# Patient Record
(demographics unavailable — no encounter records)

---

## 2024-12-20 NOTE — HISTORY OF PRESENT ILLNESS
[de-identified] : Mr. JACQUIE ARCE is a 26-year-old male here today as a hospital follow-up for Cerebral venous thrombosis, most likely due to Myeloproliferative disorder, ET confirmed, JAK2 (+).  He presented with a chief complaint of headache over the previous 2 weeks. Patient went to see ophthalmologist for contacts renewal and was noted to have optic nerve swelling b/l. Patient then saw neurologist who noted papilledema and ordered brain MRI suspicious for sinus thrombosis. MRI was done but neurology wanted admission for MRV and possible spinal tap. Patient had headaches recently but denies any complaints at this time, is asymptomatic.  RADIOLOGIC WORKUP MR Venogram Brain (5/15/19) IMPRESSION: 1. *Extensive, long segment, but incompletely occlusive thrombus within the superior sagittal sinus, and right transverse and sigmoid sinuses. Additional thrombus within scattered cortical veins. 2. Apparent nasopharyngeal and palatine tonsillar edema, recommend correlation with exam. 3. 7 mm pineal cyst, incidental. CT Brain (4/29/19) IMPRESSION: Small (4 mm) hyperdense lesion at the level of the anterior third ventricle/foramen of Dominguez potentially reflecting a colloid cyst in this location. Recommend further evaluation with a contrast-enhanced brain MRI. No evidence of hydrocephalus.  LAB WORKUP (6/10/19) PTT 30.2, INR 2.5 (5/18/19) WBC 8.62, Hgb 16.2,    Since he had thrombocytosis, we did a Jak2 which was positive.  [de-identified] : 6/26/19 He is doing well. He has no issues with Hydrea. His PLT did come down but jose under 400,000, granted PLT count is not a risk factor for thrombosis maybe just being on Hydrea alone is enough to decrease events with Coumadin? but at this point will increase Hydrea to 1000 mg M/W/F and all other days 500 mg.  7/17/19 He is here for follow-up. CBC is stable, but PLT still high at 441,000. We will increase Hydrea to 1,000 daily. He is feeling well otherwise, no headaches, chest pain or visual disturbances. No refills needed.   8/14/19 Patient is here for a follow-up visit. He is feeling well with no complaints. Most recent CBC is normal at goal, from today. Patient denies fever, chills, nausea, vomiting. He has resumed exercising. He is tolerating Hydrea and reports no headaches.    10/16/19 Patient is here for a follow-up visit of JAK2 (+) ET and Cerebral venous thrombosis. He is feeling well, only reports few spots on dorsal aspect of both hands near the thumb, bilaterally, unclear etiology. Most recent CBC is stable. Patient denies fever, chills, nausea, vomiting or blurry vision.  1/15/20 He is doing well. No complaints. Vision is good. CBC is WNL.  6/8/2020 Patient is here for a follow-up visit of JAK2 (+) ET and Cerebral venous thrombosis. He is feeling well today, remains on Coumadin without complications. Most recent CBC is stable. He takes Hydrea 500mg BID, tolerating well.  Patient denies fever, chills, new SOB, CP, blurry vision or bleeding.    12/28/20 He is here for follow up. He feels well. No issues with Coumadin or Hydrea. CBC is at goal.  6/23/21 Patient is here for a follow-up visit of JAK2 (+) ET and Cerebral venous thrombosis. He is feeling well today, remains on Coumadin without complications (follows with [facility]); most recent INR is 2.9. Reiterated importance for strict smoking cessation. Most recent CBC is essentially stable. He takes Hydrea 500mg BID, tolerating well. Patient denies fever, chills, new SOB, CP, blurry vision or bleeding.   12/15/21 Patient is here for a follow-up visit of JAK2 (+) ET and Cerebral venous thrombosis. He is feeling well today, remains on Coumadin without complications (follows with [facility]); most recent INR is 2.4. Most recent CBC is essentially stable with only slight leukopenia (normal diff). He takes Hydrea 500mg BID, tolerating well and requesting refill. Patient denies fever, chills, new SOB, CP, blurry vision or bleeding. He has shed some weight, intentionally, via increased physical activity. Reiterated importance for strict smoking cessation.    6/17/22 He is here for follow up. Last INR was 2.2 and CBC from today with HCT of 46.7.  4/19/23 He is here for follow-up. He continues to follow with the coumadin clinic and his INRs have remained in therapeutic ranges. His CBC today shows platelet count of 255, hemoglobin 17.0 g/dL, HCT 47.7%. He is doing well with no complaints today. We discussed increasing his Hydrea to 1500 in order to keep his HCT below 45% in case this is PV. He has not followed up with ophthalmology.   5/24/23 He is here for follow-up. His HCT is now at goal with higher dose of Hydrea. He is doing well. Will try to loose weight.  8/23/23 He is here for follow up. CBC today showed HGB of 11.1 , WBC 3.72. He feels tired at times.  10/25/23 He is here for follow up. He feels well. CBC is at goal.  02/08/2024 Reports feeling well, no changes in chronic conditions or new complaints since the last visit.  7/16/2024 He is here for follow up. CBC is at gaol. He takes extra dose of hydrea twice a week but does not recall the days. INR as a bit high today he was seen in coumadin center.  December 18 2024 Robles is here for follow-up he is doing well no issues with Coumadin or Hydrea

## 2024-12-20 NOTE — REASON FOR VISIT
[Follow-Up Visit] : a follow-up visit for [FreeTextEntry2] : Cerebral venous thrombosis & ET, JAK2 (+)

## 2024-12-20 NOTE — PHYSICAL EXAM
[Fully active, able to carry on all pre-disease performance without restriction] : Status 0 - Fully active, able to carry on all pre-disease performance without restriction [Normal] : affect appropriate [de-identified] : few small bumps on dorsal aspect of both hands, not bothersome, possibly 2/2 Hydrea

## 2024-12-20 NOTE — ASSESSMENT
[FreeTextEntry1] : # Cerebral venous thrombosis, due to ET, JAK2 (+), MPN (+). - HGB has been on the higher side maybe P.Vera. - His labs showed a PLT level of 650K (no available platelets count from before). - We discussed that his elevated hematocrit may signify that this is PV rather than ET. In PV, the goal hematocrit is below 45%, mainly to prevent thrombotic events.  - we previously spoke about prognosis, risk about leukemic transformation and bone marrow biopsy to r/o premylofibrosis (mainly for risk stratification/prognosis) he wanted to hold of a marrow which is reasonable.    PLAN: - CBC CMP ordered will give him  call and if needed will adjust Hydrea  - continue Hydrea 1500 mg PO  twice a week and 1000 mg PO other days  - continue life-long anticoagulation with Coumadin - F/U with [facility] for INR monitoring as indicated. HE is aware we can switch to [facility]  - F/U with follow-up with ophthalmology (at least annually) as his main symptoms were related to his papilledema at diagnosis to make sure no longer has papilledema  - Labs today: CBC CMP RTC in 5 months with CBC CMP if CBC at goal
[FreeTextEntry1] : # Cerebral venous thrombosis, due to ET, JAK2 (+), MPN (+). - HGB has been on the higher side maybe P.Vera. - His labs showed a PLT level of 650K (no available platelets count from before). - We discussed that his elevated hematocrit may signify that this is PV rather than ET. In PV, the goal hematocrit is below 45%, mainly to prevent thrombotic events.  - we previously spoke about prognosis, risk about leukemic transformation and bone marrow biopsy to r/o premylofibrosis (mainly for risk stratification/prognosis) he wanted to hold of a marrow which is reasonable.    PLAN: - CBC CMP ordered will give him  call and if needed will adjust Hydrea  - continue Hydrea 1500 mg PO  twice a week and 1000 mg PO other days  - continue life-long anticoagulation with Coumadin - F/U with [facility] for INR monitoring as indicated. HE is aware we can switch to [facility]  - F/U with follow-up with ophthalmology (at least annually) as his main symptoms were related to his papilledema at diagnosis to make sure no longer has papilledema  - Labs today: CBC CMP RTC in 5 months with CBC CMP if CBC at goal
Camilo Castrejon(Attending)

## 2024-12-20 NOTE — PHYSICAL EXAM
[Fully active, able to carry on all pre-disease performance without restriction] : Status 0 - Fully active, able to carry on all pre-disease performance without restriction [Normal] : affect appropriate [de-identified] : few small bumps on dorsal aspect of both hands, not bothersome, possibly 2/2 Hydrea

## 2024-12-20 NOTE — HISTORY OF PRESENT ILLNESS
[de-identified] : Mr. JACQUIE ARCE is a 26-year-old male here today as a hospital follow-up for Cerebral venous thrombosis, most likely due to Myeloproliferative disorder, ET confirmed, JAK2 (+).  He presented with a chief complaint of headache over the previous 2 weeks. Patient went to see ophthalmologist for contacts renewal and was noted to have optic nerve swelling b/l. Patient then saw neurologist who noted papilledema and ordered brain MRI suspicious for sinus thrombosis. MRI was done but neurology wanted admission for MRV and possible spinal tap. Patient had headaches recently but denies any complaints at this time, is asymptomatic.  RADIOLOGIC WORKUP MR Venogram Brain (5/15/19) IMPRESSION: 1. *Extensive, long segment, but incompletely occlusive thrombus within the superior sagittal sinus, and right transverse and sigmoid sinuses. Additional thrombus within scattered cortical veins. 2. Apparent nasopharyngeal and palatine tonsillar edema, recommend correlation with exam. 3. 7 mm pineal cyst, incidental. CT Brain (4/29/19) IMPRESSION: Small (4 mm) hyperdense lesion at the level of the anterior third ventricle/foramen of Dominguez potentially reflecting a colloid cyst in this location. Recommend further evaluation with a contrast-enhanced brain MRI. No evidence of hydrocephalus.  LAB WORKUP (6/10/19) PTT 30.2, INR 2.5 (5/18/19) WBC 8.62, Hgb 16.2,    Since he had thrombocytosis, we did a Jak2 which was positive.  [de-identified] : 6/26/19 He is doing well. He has no issues with Hydrea. His PLT did come down but jose under 400,000, granted PLT count is not a risk factor for thrombosis maybe just being on Hydrea alone is enough to decrease events with Coumadin? but at this point will increase Hydrea to 1000 mg M/W/F and all other days 500 mg.  7/17/19 He is here for follow-up. CBC is stable, but PLT still high at 441,000. We will increase Hydrea to 1,000 daily. He is feeling well otherwise, no headaches, chest pain or visual disturbances. No refills needed.   8/14/19 Patient is here for a follow-up visit. He is feeling well with no complaints. Most recent CBC is normal at goal, from today. Patient denies fever, chills, nausea, vomiting. He has resumed exercising. He is tolerating Hydrea and reports no headaches.    10/16/19 Patient is here for a follow-up visit of JAK2 (+) ET and Cerebral venous thrombosis. He is feeling well, only reports few spots on dorsal aspect of both hands near the thumb, bilaterally, unclear etiology. Most recent CBC is stable. Patient denies fever, chills, nausea, vomiting or blurry vision.  1/15/20 He is doing well. No complaints. Vision is good. CBC is WNL.  6/8/2020 Patient is here for a follow-up visit of JAK2 (+) ET and Cerebral venous thrombosis. He is feeling well today, remains on Coumadin without complications. Most recent CBC is stable. He takes Hydrea 500mg BID, tolerating well.  Patient denies fever, chills, new SOB, CP, blurry vision or bleeding.    12/28/20 He is here for follow up. He feels well. No issues with Coumadin or Hydrea. CBC is at goal.  6/23/21 Patient is here for a follow-up visit of JAK2 (+) ET and Cerebral venous thrombosis. He is feeling well today, remains on Coumadin without complications (follows with [facility]); most recent INR is 2.9. Reiterated importance for strict smoking cessation. Most recent CBC is essentially stable. He takes Hydrea 500mg BID, tolerating well. Patient denies fever, chills, new SOB, CP, blurry vision or bleeding.   12/15/21 Patient is here for a follow-up visit of JAK2 (+) ET and Cerebral venous thrombosis. He is feeling well today, remains on Coumadin without complications (follows with [facility]); most recent INR is 2.4. Most recent CBC is essentially stable with only slight leukopenia (normal diff). He takes Hydrea 500mg BID, tolerating well and requesting refill. Patient denies fever, chills, new SOB, CP, blurry vision or bleeding. He has shed some weight, intentionally, via increased physical activity. Reiterated importance for strict smoking cessation.    6/17/22 He is here for follow up. Last INR was 2.2 and CBC from today with HCT of 46.7.  4/19/23 He is here for follow-up. He continues to follow with the coumadin clinic and his INRs have remained in therapeutic ranges. His CBC today shows platelet count of 255, hemoglobin 17.0 g/dL, HCT 47.7%. He is doing well with no complaints today. We discussed increasing his Hydrea to 1500 in order to keep his HCT below 45% in case this is PV. He has not followed up with ophthalmology.   5/24/23 He is here for follow-up. His HCT is now at goal with higher dose of Hydrea. He is doing well. Will try to loose weight.  8/23/23 He is here for follow up. CBC today showed HGB of 11.1 , WBC 3.72. He feels tired at times.  10/25/23 He is here for follow up. He feels well. CBC is at goal.  02/08/2024 Reports feeling well, no changes in chronic conditions or new complaints since the last visit.  7/16/2024 He is here for follow up. CBC is at gaol. He takes extra dose of hydrea twice a week but does not recall the days. INR as a bit high today he was seen in coumadin center.  December 18 2024 Robles is here for follow-up he is doing well no issues with Coumadin or Hydrea

## 2025-02-27 NOTE — HISTORY OF PRESENT ILLNESS
[de-identified] : Mr. JACQUIE ARCE is a 26-year-old male here today as a hospital follow-up for Cerebral venous thrombosis, most likely due to Myeloproliferative disorder, ET confirmed, JAK2 (+).  He presented with a chief complaint of headache over the previous 2 weeks. Patient went to see ophthalmologist for contacts renewal and was noted to have optic nerve swelling b/l. Patient then saw neurologist who noted papilledema and ordered brain MRI suspicious for sinus thrombosis. MRI was done but neurology wanted admission for MRV and possible spinal tap. Patient had headaches recently but denies any complaints at this time, is asymptomatic.  RADIOLOGIC WORKUP MR Venogram Brain (5/15/19) IMPRESSION: 1. *Extensive, long segment, but incompletely occlusive thrombus within the superior sagittal sinus, and right transverse and sigmoid sinuses. Additional thrombus within scattered cortical veins. 2. Apparent nasopharyngeal and palatine tonsillar edema, recommend correlation with exam. 3. 7 mm pineal cyst, incidental. CT Brain (4/29/19) IMPRESSION: Small (4 mm) hyperdense lesion at the level of the anterior third ventricle/foramen of Dominguez potentially reflecting a colloid cyst in this location. Recommend further evaluation with a contrast-enhanced brain MRI. No evidence of hydrocephalus.  LAB WORKUP (6/10/19) PTT 30.2, INR 2.5 (5/18/19) WBC 8.62, Hgb 16.2,    Since he had thrombocytosis, we did a Jak2 which was positive.  [de-identified] : 6/26/19 He is doing well. He has no issues with Hydrea. His PLT did come down but jose under 400,000, granted PLT count is not a risk factor for thrombosis maybe just being on Hydrea alone is enough to decrease events with Coumadin? but at this point will increase Hydrea to 1000 mg M/W/F and all other days 500 mg.  7/17/19 He is here for follow-up. CBC is stable, but PLT still high at 441,000. We will increase Hydrea to 1,000 daily. He is feeling well otherwise, no headaches, chest pain or visual disturbances. No refills needed.   8/14/19 Patient is here for a follow-up visit. He is feeling well with no complaints. Most recent CBC is normal at goal, from today. Patient denies fever, chills, nausea, vomiting. He has resumed exercising. He is tolerating Hydrea and reports no headaches.    10/16/19 Patient is here for a follow-up visit of JAK2 (+) ET and Cerebral venous thrombosis. He is feeling well, only reports few spots on dorsal aspect of both hands near the thumb, bilaterally, unclear etiology. Most recent CBC is stable. Patient denies fever, chills, nausea, vomiting or blurry vision.  1/15/20 He is doing well. No complaints. Vision is good. CBC is WNL.  6/8/2020 Patient is here for a follow-up visit of JAK2 (+) ET and Cerebral venous thrombosis. He is feeling well today, remains on Coumadin without complications. Most recent CBC is stable. He takes Hydrea 500mg BID, tolerating well.  Patient denies fever, chills, new SOB, CP, blurry vision or bleeding.    12/28/20 He is here for follow up. He feels well. No issues with Coumadin or Hydrea. CBC is at goal.  6/23/21 Patient is here for a follow-up visit of JAK2 (+) ET and Cerebral venous thrombosis. He is feeling well today, remains on Coumadin without complications (follows with [facility]); most recent INR is 2.9. Reiterated importance for strict smoking cessation. Most recent CBC is essentially stable. He takes Hydrea 500mg BID, tolerating well. Patient denies fever, chills, new SOB, CP, blurry vision or bleeding.   12/15/21 Patient is here for a follow-up visit of JAK2 (+) ET and Cerebral venous thrombosis. He is feeling well today, remains on Coumadin without complications (follows with [facility]); most recent INR is 2.4. Most recent CBC is essentially stable with only slight leukopenia (normal diff). He takes Hydrea 500mg BID, tolerating well and requesting refill. Patient denies fever, chills, new SOB, CP, blurry vision or bleeding. He has shed some weight, intentionally, via increased physical activity. Reiterated importance for strict smoking cessation.    6/17/22 He is here for follow up. Last INR was 2.2 and CBC from today with HCT of 46.7.  4/19/23 He is here for follow-up. He continues to follow with the coumadin clinic and his INRs have remained in therapeutic ranges. His CBC today shows platelet count of 255, hemoglobin 17.0 g/dL, HCT 47.7%. He is doing well with no complaints today. We discussed increasing his Hydrea to 1500 in order to keep his HCT below 45% in case this is PV. He has not followed up with ophthalmology.   5/24/23 He is here for follow-up. His HCT is now at goal with higher dose of Hydrea. He is doing well. Will try to loose weight.  8/23/23 He is here for follow up. CBC today showed HGB of 11.1 , WBC 3.72. He feels tired at times.  10/25/23 He is here for follow up. He feels well. CBC is at goal.  02/08/2024 Reports feeling well, no changes in chronic conditions or new complaints since the last visit.  7/16/2024 He is here for follow up. CBC is at gaol. He takes extra dose of hydrea twice a week but does not recall the days. INR as a bit high today he was seen in coumadin center.  December 18 2024 Robles is here for follow-up he is doing well no issues with Coumadin or Hydrea  February 27, 2025 He is here for follow-up he has been on Hydrea 1500 mg daily since last CBC from December had a hematocrit of 46% today CBC is at goal

## 2025-02-27 NOTE — ASSESSMENT
[FreeTextEntry1] : # Cerebral venous thrombosis, due to ET, JAK2 (+), MPN (+). - HGB has been on the higher side maybe P.Vera. - His labs showed a PLT level of 650K (no available platelets count from before). - We discussed that his elevated hematocrit may signify that this is PV rather than ET. In PV, the goal hematocrit is below 45%, mainly to prevent thrombotic events.  - we previously spoke about prognosis, risk about leukemic transformation and bone marrow biopsy to r/o premylofibrosis (mainly for risk stratification/prognosis) he wanted to hold of a marrow which is reasonable.    PLAN: - CBC CMP were done CBC is at goal - continue Hydrea 1500 mg PO daily - continue life-long anticoagulation with Coumadin - F/U with [facility] for INR monitoring as indicated. HE is aware we can switch to [facility]  - F/U with follow-up with ophthalmology (at least annually) as his main symptoms were related to his papilledema at diagnosis to make sure no longer has papilledema  - Labs today: CBC CMP RTC in 4 months is fine

## 2025-02-27 NOTE — PHYSICAL EXAM
[Fully active, able to carry on all pre-disease performance without restriction] : Status 0 - Fully active, able to carry on all pre-disease performance without restriction [Normal] : affect appropriate [de-identified] : few small bumps on dorsal aspect of both hands, not bothersome, possibly 2/2 Hydrea

## 2025-06-25 NOTE — PHYSICAL EXAM
[Fully active, able to carry on all pre-disease performance without restriction] : Status 0 - Fully active, able to carry on all pre-disease performance without restriction [Normal] : affect appropriate [de-identified] : few small bumps on dorsal aspect of both hands, not bothersome, possibly 2/2 Hydrea

## 2025-06-25 NOTE — HISTORY OF PRESENT ILLNESS
[de-identified] : Mr. JACQUIE ARCE is a 26-year-old male here today as a hospital follow-up for Cerebral venous thrombosis, most likely due to Myeloproliferative disorder, ET confirmed, JAK2 (+).  He presented with a chief complaint of headache over the previous 2 weeks. Patient went to see ophthalmologist for contacts renewal and was noted to have optic nerve swelling b/l. Patient then saw neurologist who noted papilledema and ordered brain MRI suspicious for sinus thrombosis. MRI was done but neurology wanted admission for MRV and possible spinal tap. Patient had headaches recently but denies any complaints at this time, is asymptomatic.  RADIOLOGIC WORKUP MR Venogram Brain (5/15/19) IMPRESSION: 1. *Extensive, long segment, but incompletely occlusive thrombus within the superior sagittal sinus, and right transverse and sigmoid sinuses. Additional thrombus within scattered cortical veins. 2. Apparent nasopharyngeal and palatine tonsillar edema, recommend correlation with exam. 3. 7 mm pineal cyst, incidental. CT Brain (4/29/19) IMPRESSION: Small (4 mm) hyperdense lesion at the level of the anterior third ventricle/foramen of Dominguez potentially reflecting a colloid cyst in this location. Recommend further evaluation with a contrast-enhanced brain MRI. No evidence of hydrocephalus.  LAB WORKUP (6/10/19) PTT 30.2, INR 2.5 (5/18/19) WBC 8.62, Hgb 16.2,    Since he had thrombocytosis, we did a Jak2 which was positive.  [de-identified] : 6/26/19 He is doing well. He has no issues with Hydrea. His PLT did come down but joes under 400,000, granted PLT count is not a risk factor for thrombosis maybe just being on Hydrea alone is enough to decrease events with Coumadin? but at this point will increase Hydrea to 1000 mg M/W/F and all other days 500 mg.  7/17/19 He is here for follow-up. CBC is stable, but PLT still high at 441,000. We will increase Hydrea to 1,000 daily. He is feeling well otherwise, no headaches, chest pain or visual disturbances. No refills needed.   8/14/19 Patient is here for a follow-up visit. He is feeling well with no complaints. Most recent CBC is normal at goal, from today. Patient denies fever, chills, nausea, vomiting. He has resumed exercising. He is tolerating Hydrea and reports no headaches.    10/16/19 Patient is here for a follow-up visit of JAK2 (+) ET and Cerebral venous thrombosis. He is feeling well, only reports few spots on dorsal aspect of both hands near the thumb, bilaterally, unclear etiology. Most recent CBC is stable. Patient denies fever, chills, nausea, vomiting or blurry vision.  1/15/20 He is doing well. No complaints. Vision is good. CBC is WNL.  6/8/2020 Patient is here for a follow-up visit of JAK2 (+) ET and Cerebral venous thrombosis. He is feeling well today, remains on Coumadin without complications. Most recent CBC is stable. He takes Hydrea 500mg BID, tolerating well.  Patient denies fever, chills, new SOB, CP, blurry vision or bleeding.    12/28/20 He is here for follow up. He feels well. No issues with Coumadin or Hydrea. CBC is at goal.  6/23/21 Patient is here for a follow-up visit of JAK2 (+) ET and Cerebral venous thrombosis. He is feeling well today, remains on Coumadin without complications (follows with [facility]); most recent INR is 2.9. Reiterated importance for strict smoking cessation. Most recent CBC is essentially stable. He takes Hydrea 500mg BID, tolerating well. Patient denies fever, chills, new SOB, CP, blurry vision or bleeding.   12/15/21 Patient is here for a follow-up visit of JAK2 (+) ET and Cerebral venous thrombosis. He is feeling well today, remains on Coumadin without complications (follows with [facility]); most recent INR is 2.4. Most recent CBC is essentially stable with only slight leukopenia (normal diff). He takes Hydrea 500mg BID, tolerating well and requesting refill. Patient denies fever, chills, new SOB, CP, blurry vision or bleeding. He has shed some weight, intentionally, via increased physical activity. Reiterated importance for strict smoking cessation.    6/17/22 He is here for follow up. Last INR was 2.2 and CBC from today with HCT of 46.7.  4/19/23 He is here for follow-up. He continues to follow with the coumadin clinic and his INRs have remained in therapeutic ranges. His CBC today shows platelet count of 255, hemoglobin 17.0 g/dL, HCT 47.7%. He is doing well with no complaints today. We discussed increasing his Hydrea to 1500 in order to keep his HCT below 45% in case this is PV. He has not followed up with ophthalmology.   5/24/23 He is here for follow-up. His HCT is now at goal with higher dose of Hydrea. He is doing well. Will try to loose weight.  8/23/23 He is here for follow up. CBC today showed HGB of 11.1 , WBC 3.72. He feels tired at times.  10/25/23 He is here for follow up. He feels well. CBC is at goal.  02/08/2024 Reports feeling well, no changes in chronic conditions or new complaints since the last visit.  7/16/2024 He is here for follow up. CBC is at gaol. He takes extra dose of hydrea twice a week but does not recall the days. INR as a bit high today he was seen in coumadin center.  December 18 2024 Robles is here for follow-up he is doing well no issues with Coumadin or Hydrea  February 27, 2025 He is here for follow-up he has been on Hydrea 1500 mg daily since last CBC from December had a hematocrit of 46% today CBC is at goal  6/25/2025 Robles is here for follow-up he had a CBC today white blood cell count 3.27 hemoglobin 12.3 Platelets are 249 ANC is 1300 he is doing well goes to the gym

## 2025-06-25 NOTE — ASSESSMENT
[FreeTextEntry1] : # Cerebral venous thrombosis, due to ET, JAK2 (+), MPN (+). - HGB has been on the higher side maybe P.Vera. - His labs showed a PLT level of 650K (no available platelets count from before). - We discussed that his elevated hematocrit may signify that this is PV rather than ET. In PV, the goal hematocrit is below 45%, mainly to prevent thrombotic events.  - we previously spoke about prognosis, risk about leukemic transformation and bone marrow biopsy to r/o premylofibrosis (mainly for risk stratification/prognosis) he wanted to hold of a marrow which is reasonable.    PLAN: - He is a bit neutropenic now and more anemic platelet counts are fine decided to decrease Hydrea to 1500 mg Monday through Friday and 1000 mg in the weekends and we will go from there - continue life-long anticoagulation with Coumadin - F/U with [facility] Clinic for INR monitoring as indicated. HE is aware we can switch to [facility]  - F/U with follow-up with ophthalmology (at least annually) as his main symptoms were related to his papilledema at diagnosis to make sure no longer has papilledema  - Labs today: CBC CMP RTC in3- 4 months is fine